# Patient Record
Sex: FEMALE | Race: WHITE | NOT HISPANIC OR LATINO | Employment: STUDENT | URBAN - METROPOLITAN AREA
[De-identification: names, ages, dates, MRNs, and addresses within clinical notes are randomized per-mention and may not be internally consistent; named-entity substitution may affect disease eponyms.]

---
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
## 2021-02-16 DIAGNOSIS — Z30.09 GENERAL COUNSELING FOR PRESCRIPTION OF ORAL CONTRACEPTIVES: ICD-10-CM

## 2021-07-26 DIAGNOSIS — Z30.09 GENERAL COUNSELING FOR PRESCRIPTION OF ORAL CONTRACEPTIVES: ICD-10-CM

## 2021-08-02 ENCOUNTER — E-ADVICE (OUTPATIENT)
Dept: FAMILY MEDICINE | Age: 21
End: 2021-08-02

## 2021-08-20 ENCOUNTER — OFFICE VISIT (OUTPATIENT)
Dept: FAMILY MEDICINE | Age: 21
End: 2021-08-20

## 2021-08-20 VITALS
WEIGHT: 139 LBS | DIASTOLIC BLOOD PRESSURE: 78 MMHG | SYSTOLIC BLOOD PRESSURE: 114 MMHG | TEMPERATURE: 98.7 F | HEART RATE: 68 BPM | HEIGHT: 67 IN | RESPIRATION RATE: 14 BRPM | BODY MASS INDEX: 21.82 KG/M2

## 2021-08-20 DIAGNOSIS — N94.6 DYSMENORRHEA: Primary | ICD-10-CM

## 2021-08-20 PROCEDURE — 99213 OFFICE O/P EST LOW 20 MIN: CPT | Performed by: FAMILY MEDICINE

## 2021-08-20 ASSESSMENT — PATIENT HEALTH QUESTIONNAIRE - PHQ9
CLINICAL INTERPRETATION OF PHQ9 SCORE: NO FURTHER SCREENING NEEDED
CLINICAL INTERPRETATION OF PHQ2 SCORE: NO FURTHER SCREENING NEEDED
SUM OF ALL RESPONSES TO PHQ9 QUESTIONS 1 AND 2: 0
2. FEELING DOWN, DEPRESSED OR HOPELESS: NOT AT ALL
SUM OF ALL RESPONSES TO PHQ9 QUESTIONS 1 AND 2: 0
1. LITTLE INTEREST OR PLEASURE IN DOING THINGS: NOT AT ALL

## 2021-08-22 DIAGNOSIS — Z30.09 GENERAL COUNSELING FOR PRESCRIPTION OF ORAL CONTRACEPTIVES: ICD-10-CM

## 2021-08-23 RX ORDER — NORGESTREL-ETHINYL ESTRADIOL 0.3-0.03MG
TABLET ORAL
Qty: 28 TABLET | Refills: 3 | Status: SHIPPED | OUTPATIENT
Start: 2021-08-23 | End: 2021-08-27 | Stop reason: ALTCHOICE

## 2021-08-27 ENCOUNTER — TELEPHONE (OUTPATIENT)
Dept: FAMILY MEDICINE | Age: 21
End: 2021-08-27

## 2021-08-27 ENCOUNTER — OFFICE VISIT (OUTPATIENT)
Dept: FAMILY MEDICINE | Age: 21
End: 2021-08-27

## 2021-08-27 VITALS
HEIGHT: 67 IN | TEMPERATURE: 98 F | HEART RATE: 78 BPM | DIASTOLIC BLOOD PRESSURE: 76 MMHG | SYSTOLIC BLOOD PRESSURE: 128 MMHG | RESPIRATION RATE: 15 BRPM | BODY MASS INDEX: 21.79 KG/M2 | WEIGHT: 138.8 LBS

## 2021-08-27 DIAGNOSIS — Z30.430 ENCOUNTER FOR IUD INSERTION: Primary | ICD-10-CM

## 2021-08-27 LAB
B-HCG UR QL: NEGATIVE
INTERNAL PROCEDURAL CONTROLS ACCEPTABLE: YES

## 2021-08-27 PROCEDURE — 87591 N.GONORRHOEAE DNA AMP PROB: CPT | Performed by: INTERNAL MEDICINE

## 2021-08-27 PROCEDURE — 81025 URINE PREGNANCY TEST: CPT | Performed by: FAMILY MEDICINE

## 2021-08-27 PROCEDURE — 58300 INSERT INTRAUTERINE DEVICE: CPT | Performed by: FAMILY MEDICINE

## 2021-08-27 PROCEDURE — 87491 CHLMYD TRACH DNA AMP PROBE: CPT | Performed by: INTERNAL MEDICINE

## 2021-08-30 LAB
C TRACH RRNA CVX QL NAA+PROBE: NEGATIVE
Lab: NORMAL
N GONORRHOEA RRNA CVX QL NAA+PROBE: NEGATIVE

## 2022-07-07 ENCOUNTER — IMAGING SERVICES (OUTPATIENT)
Dept: ULTRASOUND IMAGING | Age: 22
End: 2022-07-07
Attending: NURSE PRACTITIONER

## 2022-07-07 ENCOUNTER — OFFICE VISIT (OUTPATIENT)
Dept: OBGYN | Age: 22
End: 2022-07-07

## 2022-07-07 ENCOUNTER — TELEPHONE (OUTPATIENT)
Dept: OBGYN | Age: 22
End: 2022-07-07

## 2022-07-07 VITALS
WEIGHT: 137.79 LBS | HEIGHT: 67 IN | SYSTOLIC BLOOD PRESSURE: 128 MMHG | DIASTOLIC BLOOD PRESSURE: 82 MMHG | BODY MASS INDEX: 21.63 KG/M2

## 2022-07-07 DIAGNOSIS — N92.6 IRREGULAR BLEEDING: ICD-10-CM

## 2022-07-07 DIAGNOSIS — Z30.431 IUD CHECK UP: ICD-10-CM

## 2022-07-07 DIAGNOSIS — Z11.3 ROUTINE SCREENING FOR STI (SEXUALLY TRANSMITTED INFECTION): ICD-10-CM

## 2022-07-07 DIAGNOSIS — Z97.5 IUD (INTRAUTERINE DEVICE) IN PLACE: ICD-10-CM

## 2022-07-07 DIAGNOSIS — Z97.5 IUD (INTRAUTERINE DEVICE) IN PLACE: Primary | ICD-10-CM

## 2022-07-07 PROCEDURE — 87661 TRICHOMONAS VAGINALIS AMPLIF: CPT | Performed by: INTERNAL MEDICINE

## 2022-07-07 PROCEDURE — 81513 NFCT DS BV RNA VAG FLU ALG: CPT | Performed by: INTERNAL MEDICINE

## 2022-07-07 PROCEDURE — 87591 N.GONORRHOEAE DNA AMP PROB: CPT | Performed by: INTERNAL MEDICINE

## 2022-07-07 PROCEDURE — 76830 TRANSVAGINAL US NON-OB: CPT | Performed by: RADIOLOGY

## 2022-07-07 PROCEDURE — 87491 CHLMYD TRACH DNA AMP PROBE: CPT | Performed by: INTERNAL MEDICINE

## 2022-07-07 PROCEDURE — 87481 CANDIDA DNA AMP PROBE: CPT | Performed by: INTERNAL MEDICINE

## 2022-07-07 PROCEDURE — 99213 OFFICE O/P EST LOW 20 MIN: CPT | Performed by: NURSE PRACTITIONER

## 2022-07-07 PROCEDURE — 76856 US EXAM PELVIC COMPLETE: CPT | Performed by: RADIOLOGY

## 2022-07-07 PROCEDURE — 87563 M. GENITALIUM AMP PROBE: CPT | Performed by: INTERNAL MEDICINE

## 2022-07-07 RX ORDER — NORGESTIMATE AND ETHINYL ESTRADIOL 0.25-0.035
1 KIT ORAL DAILY
Qty: 84 TABLET | Refills: 0 | Status: SHIPPED | OUTPATIENT
Start: 2022-07-07 | End: 2022-10-24 | Stop reason: SDUPTHER

## 2022-07-07 RX ORDER — DOXYCYCLINE 100 MG/1
100 CAPSULE ORAL 2 TIMES DAILY
Qty: 20 CAPSULE | Refills: 0 | Status: SHIPPED | OUTPATIENT
Start: 2022-07-07 | End: 2022-07-17

## 2022-07-08 LAB
BACTERIAL VAGINOSIS VAG-IMP: NOT DETECTED
C ALBICANS DNA VAG QL NAA+PROBE: NOT DETECTED
C GLABRATA DNA VAG QL NAA+PROBE: NOT DETECTED
C TRACH RRNA CVX QL NAA+PROBE: NEGATIVE
Lab: NORMAL
M GENITALIUM DNA SPEC QL NAA+PROBE: NOT DETECTED
N GONORRHOEA RRNA CVX QL NAA+PROBE: NEGATIVE
SERVICE CMNT-IMP: NORMAL
T VAGINALIS DNA VAG QL NAA+PROBE: NOT DETECTED
T VAGINALIS RRNA CVX QL NAA+PROBE: NEGATIVE

## 2022-09-27 RX ORDER — NORGESTIMATE AND ETHINYL ESTRADIOL 0.25-0.035
KIT ORAL
Qty: 84 TABLET | Refills: 0 | OUTPATIENT
Start: 2022-09-27

## 2022-10-17 ENCOUNTER — TELEPHONE (OUTPATIENT)
Dept: FAMILY MEDICINE | Age: 22
End: 2022-10-17

## 2022-10-24 ENCOUNTER — OFFICE VISIT (OUTPATIENT)
Dept: FAMILY MEDICINE | Age: 22
End: 2022-10-24

## 2022-10-24 VITALS
HEART RATE: 70 BPM | WEIGHT: 137 LBS | SYSTOLIC BLOOD PRESSURE: 130 MMHG | DIASTOLIC BLOOD PRESSURE: 80 MMHG | TEMPERATURE: 98.3 F | HEIGHT: 67 IN | BODY MASS INDEX: 21.5 KG/M2 | RESPIRATION RATE: 14 BRPM

## 2022-10-24 DIAGNOSIS — Z12.4 CERVICAL CANCER SCREENING: ICD-10-CM

## 2022-10-24 DIAGNOSIS — Z30.41 SURVEILLANCE OF PREVIOUSLY PRESCRIBED CONTRACEPTIVE PILL: ICD-10-CM

## 2022-10-24 DIAGNOSIS — Z30.432 ENCOUNTER FOR IUD REMOVAL: Primary | ICD-10-CM

## 2022-10-24 DIAGNOSIS — N93.9 VAGINAL SPOTTING: ICD-10-CM

## 2022-10-24 PROCEDURE — 99213 OFFICE O/P EST LOW 20 MIN: CPT | Performed by: FAMILY MEDICINE

## 2022-10-24 PROCEDURE — 58301 REMOVE INTRAUTERINE DEVICE: CPT | Performed by: FAMILY MEDICINE

## 2022-10-24 PROCEDURE — 88175 CYTOPATH C/V AUTO FLUID REDO: CPT | Performed by: PATHOLOGY

## 2022-10-24 RX ORDER — NORGESTIMATE AND ETHINYL ESTRADIOL 0.25-0.035
1 KIT ORAL DAILY
Qty: 84 TABLET | Refills: 1 | Status: SHIPPED | OUTPATIENT
Start: 2022-10-24 | End: 2023-03-15

## 2022-10-26 LAB — HOLD SPECIMEN: NORMAL

## 2022-10-28 LAB
CASE RPRT: NORMAL
CLINICAL INFO: NORMAL
CYTOLOGY CVX/VAG STUDY: NORMAL
PAP EDUCATIONAL NOTE: NORMAL
SPECIMEN ADEQUACY: NORMAL

## 2023-03-14 DIAGNOSIS — Z30.41 SURVEILLANCE OF PREVIOUSLY PRESCRIBED CONTRACEPTIVE PILL: ICD-10-CM

## 2023-03-15 RX ORDER — NORGESTIMATE AND ETHINYL ESTRADIOL 0.25-0.035
1 KIT ORAL DAILY
Qty: 84 TABLET | Refills: 1 | OUTPATIENT
Start: 2023-03-15

## 2023-03-15 RX ORDER — NORGESTIMATE AND ETHINYL ESTRADIOL 0.25-0.035
KIT ORAL
Qty: 84 TABLET | Refills: 1 | Status: SHIPPED | OUTPATIENT
Start: 2023-03-15 | End: 2023-09-05

## 2023-05-04 ENCOUNTER — APPOINTMENT (OUTPATIENT)
Dept: URBAN - METROPOLITAN AREA CLINIC 239 | Age: 23
Setting detail: DERMATOLOGY
End: 2023-05-06

## 2023-05-04 DIAGNOSIS — L72.8 OTHER FOLLICULAR CYSTS OF THE SKIN AND SUBCUTANEOUS TISSUE: ICD-10-CM

## 2023-05-04 PROCEDURE — 11900 INJECT SKIN LESIONS </W 7: CPT

## 2023-05-04 PROCEDURE — OTHER PRESCRIPTION MEDICATION MANAGEMENT: OTHER

## 2023-05-04 PROCEDURE — OTHER INTRALESIONAL KENALOG: OTHER

## 2023-05-04 PROCEDURE — OTHER PRESCRIPTION: OTHER

## 2023-05-04 RX ORDER — HALOBETASOL PROPIONATE 0.5 MG/G
CREAM TOPICAL
Qty: 15 | Refills: 0 | Status: ERX | COMMUNITY
Start: 2023-05-04

## 2023-05-04 ASSESSMENT — LOCATION SIMPLE DESCRIPTION DERM: LOCATION SIMPLE: NOSE

## 2023-05-04 ASSESSMENT — LOCATION ZONE DERM: LOCATION ZONE: NOSE

## 2023-05-04 ASSESSMENT — LOCATION DETAILED DESCRIPTION DERM: LOCATION DETAILED: NASAL ROOT

## 2023-05-04 NOTE — PROCEDURE: PRESCRIPTION MEDICATION MANAGEMENT
Initiate Treatment: Discussed that pt can use halobetasol bid to tid for 2 to 3 days (maximum) on a new acne cyst.  She gets 2 to 3 or these per month
Render In Strict Bullet Format?: No
Detail Level: Zone

## 2023-09-04 DIAGNOSIS — Z30.41 SURVEILLANCE OF PREVIOUSLY PRESCRIBED CONTRACEPTIVE PILL: ICD-10-CM

## 2023-09-05 RX ORDER — NORGESTIMATE AND ETHINYL ESTRADIOL 0.25-0.035
KIT ORAL
Qty: 84 TABLET | Refills: 1 | Status: SHIPPED | OUTPATIENT
Start: 2023-09-05

## 2024-02-06 DIAGNOSIS — Z30.41 SURVEILLANCE OF PREVIOUSLY PRESCRIBED CONTRACEPTIVE PILL: ICD-10-CM

## 2024-02-07 RX ORDER — NORGESTIMATE AND ETHINYL ESTRADIOL 0.25-0.035
1 KIT ORAL DAILY
Qty: 84 TABLET | Refills: 1 | OUTPATIENT
Start: 2024-02-07

## 2024-02-11 DIAGNOSIS — Z30.41 SURVEILLANCE OF PREVIOUSLY PRESCRIBED CONTRACEPTIVE PILL: ICD-10-CM

## 2024-02-12 RX ORDER — NORGESTIMATE AND ETHINYL ESTRADIOL 0.25-0.035
1 KIT ORAL DAILY
Qty: 84 TABLET | Refills: 1 | OUTPATIENT
Start: 2024-02-12

## 2024-03-02 DIAGNOSIS — Z30.41 SURVEILLANCE OF PREVIOUSLY PRESCRIBED CONTRACEPTIVE PILL: ICD-10-CM

## 2024-03-04 RX ORDER — NORGESTIMATE AND ETHINYL ESTRADIOL 0.25-0.035
KIT ORAL
Qty: 84 TABLET | Refills: 1 | OUTPATIENT
Start: 2024-03-04

## 2024-03-07 RX ORDER — NORGESTIMATE AND ETHINYL ESTRADIOL 0.25-0.035
1 KIT ORAL DAILY
Qty: 84 TABLET | Refills: 0 | Status: SHIPPED | OUTPATIENT
Start: 2024-03-07

## 2024-04-12 ENCOUNTER — APPOINTMENT (OUTPATIENT)
Dept: FAMILY MEDICINE | Age: 24
End: 2024-04-12

## 2024-05-14 ENCOUNTER — APPOINTMENT (OUTPATIENT)
Dept: FAMILY MEDICINE | Age: 24
End: 2024-05-14

## 2024-05-14 VITALS
RESPIRATION RATE: 15 BRPM | SYSTOLIC BLOOD PRESSURE: 122 MMHG | WEIGHT: 151 LBS | DIASTOLIC BLOOD PRESSURE: 84 MMHG | BODY MASS INDEX: 23.65 KG/M2 | TEMPERATURE: 97.9 F | HEART RATE: 74 BPM

## 2024-05-14 DIAGNOSIS — Z00.01 ENCOUNTER FOR ROUTINE ADULT HEALTH EXAMINATION WITH ABNORMAL FINDINGS: Primary | ICD-10-CM

## 2024-05-14 DIAGNOSIS — Z30.41 SURVEILLANCE OF PREVIOUSLY PRESCRIBED CONTRACEPTIVE PILL: ICD-10-CM

## 2024-05-14 DIAGNOSIS — J30.2 SEASONAL ALLERGIES: ICD-10-CM

## 2024-05-14 PROCEDURE — 99395 PREV VISIT EST AGE 18-39: CPT | Performed by: FAMILY MEDICINE

## 2024-05-14 RX ORDER — NORGESTIMATE AND ETHINYL ESTRADIOL 0.25-0.035
1 KIT ORAL DAILY
Qty: 84 TABLET | Refills: 3 | Status: SHIPPED
Start: 2024-05-14

## 2024-05-14 ASSESSMENT — PATIENT HEALTH QUESTIONNAIRE - PHQ9
CLINICAL INTERPRETATION OF PHQ2 SCORE: NO FURTHER SCREENING NEEDED
SUM OF ALL RESPONSES TO PHQ9 QUESTIONS 1 AND 2: 0
1. LITTLE INTEREST OR PLEASURE IN DOING THINGS: NOT AT ALL
2. FEELING DOWN, DEPRESSED OR HOPELESS: NOT AT ALL
SUM OF ALL RESPONSES TO PHQ9 QUESTIONS 1 AND 2: 0

## 2024-06-09 DIAGNOSIS — Z30.41 SURVEILLANCE OF PREVIOUSLY PRESCRIBED CONTRACEPTIVE PILL: ICD-10-CM

## 2024-06-10 RX ORDER — NORGESTIMATE AND ETHINYL ESTRADIOL 0.25-0.035
1 KIT ORAL DAILY
Qty: 84 TABLET | Refills: 3 | Status: SHIPPED | OUTPATIENT
Start: 2024-06-10

## 2024-07-22 ENCOUNTER — E-ADVICE (OUTPATIENT)
Dept: FAMILY MEDICINE | Age: 24
End: 2024-07-22

## 2024-07-22 DIAGNOSIS — Z30.41 SURVEILLANCE OF PREVIOUSLY PRESCRIBED CONTRACEPTIVE PILL: ICD-10-CM

## 2024-07-22 DIAGNOSIS — F41.1 GENERALIZED ANXIETY DISORDER: Primary | ICD-10-CM

## 2024-07-23 RX ORDER — BUSPIRONE HYDROCHLORIDE 7.5 MG/1
7.5 TABLET ORAL 2 TIMES DAILY
Qty: 60 TABLET | Refills: 1 | Status: SHIPPED | OUTPATIENT
Start: 2024-07-23

## 2024-09-24 DIAGNOSIS — F41.1 GENERALIZED ANXIETY DISORDER: ICD-10-CM

## 2024-09-24 RX ORDER — BUSPIRONE HYDROCHLORIDE 7.5 MG/1
7.5 TABLET ORAL 2 TIMES DAILY
Qty: 60 TABLET | Refills: 0 | Status: SHIPPED | OUTPATIENT
Start: 2024-09-24 | End: 2024-09-26 | Stop reason: SDUPTHER

## 2024-09-26 DIAGNOSIS — F41.1 GENERALIZED ANXIETY DISORDER: ICD-10-CM

## 2024-09-27 RX ORDER — BUSPIRONE HYDROCHLORIDE 7.5 MG/1
7.5 TABLET ORAL 2 TIMES DAILY
Qty: 60 TABLET | Refills: 0 | Status: SHIPPED | OUTPATIENT
Start: 2024-09-27

## 2024-11-24 DIAGNOSIS — Z30.41 SURVEILLANCE OF PREVIOUSLY PRESCRIBED CONTRACEPTIVE PILL: ICD-10-CM

## 2024-11-25 RX ORDER — NORGESTIMATE AND ETHINYL ESTRADIOL 0.25-0.035
1 KIT ORAL DAILY
Qty: 84 TABLET | Refills: 3 | Status: SHIPPED | OUTPATIENT
Start: 2024-11-25

## 2024-12-08 DIAGNOSIS — F41.1 GENERALIZED ANXIETY DISORDER: ICD-10-CM

## 2024-12-09 DIAGNOSIS — F41.1 GENERALIZED ANXIETY DISORDER: ICD-10-CM

## 2024-12-09 RX ORDER — BUSPIRONE HYDROCHLORIDE 7.5 MG/1
7.5 TABLET ORAL 2 TIMES DAILY
Qty: 180 TABLET | Refills: 0 | Status: SHIPPED | OUTPATIENT
Start: 2024-12-09

## 2024-12-09 RX ORDER — BUSPIRONE HYDROCHLORIDE 7.5 MG/1
7.5 TABLET ORAL 2 TIMES DAILY
Qty: 180 TABLET | Refills: 0 | Status: CANCELLED | OUTPATIENT
Start: 2024-12-09

## 2025-03-01 DIAGNOSIS — Z30.41 SURVEILLANCE OF PREVIOUSLY PRESCRIBED CONTRACEPTIVE PILL: ICD-10-CM

## 2025-03-03 RX ORDER — NORGESTIMATE AND ETHINYL ESTRADIOL 0.25-0.035
1 KIT ORAL DAILY
Qty: 84 TABLET | Refills: 3 | Status: SHIPPED | OUTPATIENT
Start: 2025-03-03

## 2025-03-22 DIAGNOSIS — F41.1 GENERALIZED ANXIETY DISORDER: ICD-10-CM

## 2025-03-24 RX ORDER — BUSPIRONE HYDROCHLORIDE 7.5 MG/1
7.5 TABLET ORAL 2 TIMES DAILY
Qty: 180 TABLET | Refills: 0 | Status: SHIPPED | OUTPATIENT
Start: 2025-03-24

## 2025-08-03 DIAGNOSIS — F41.1 GENERALIZED ANXIETY DISORDER: ICD-10-CM

## 2025-08-05 DIAGNOSIS — F41.1 GENERALIZED ANXIETY DISORDER: ICD-10-CM

## 2025-08-05 RX ORDER — BUSPIRONE HYDROCHLORIDE 7.5 MG/1
7.5 TABLET ORAL 2 TIMES DAILY
Qty: 180 TABLET | Refills: 1 | Status: SHIPPED | OUTPATIENT
Start: 2025-08-05

## 2025-08-05 RX ORDER — BUSPIRONE HYDROCHLORIDE 7.5 MG/1
7.5 TABLET ORAL 2 TIMES DAILY
Qty: 180 TABLET | Refills: 1 | OUTPATIENT
Start: 2025-08-05

## 2025-08-15 SDOH — ECONOMIC STABILITY: FOOD INSECURITY: WITHIN THE PAST 12 MONTHS, THE FOOD YOU BOUGHT JUST DIDN'T LAST AND YOU DIDN'T HAVE MONEY TO GET MORE.: NEVER TRUE

## 2025-08-15 SDOH — ECONOMIC STABILITY: HOUSING INSECURITY: DO YOU HAVE PROBLEMS WITH ANY OF THE FOLLOWING?: NONE OF THE ABOVE

## 2025-08-15 SDOH — ECONOMIC STABILITY: TRANSPORTATION INSECURITY
IN THE PAST 12 MONTHS, HAS LACK OF RELIABLE TRANSPORTATION KEPT YOU FROM MEDICAL APPOINTMENTS, MEETINGS, WORK OR FROM GETTING THINGS NEEDED FOR DAILY LIVING?: NO

## 2025-08-15 SDOH — ECONOMIC STABILITY: HOUSING INSECURITY: WHAT IS YOUR LIVING SITUATION TODAY?: I HAVE A STEADY PLACE TO LIVE

## 2025-08-15 ASSESSMENT — SOCIAL DETERMINANTS OF HEALTH (SDOH): IN THE PAST 12 MONTHS, HAS THE ELECTRIC, GAS, OIL, OR WATER COMPANY THREATENED TO SHUT OFF SERVICE IN YOUR HOME?: NO

## 2025-08-22 ENCOUNTER — APPOINTMENT (OUTPATIENT)
Dept: FAMILY MEDICINE | Age: 25
End: 2025-08-22

## 2025-08-22 VITALS
TEMPERATURE: 97.8 F | DIASTOLIC BLOOD PRESSURE: 70 MMHG | BODY MASS INDEX: 22.44 KG/M2 | RESPIRATION RATE: 18 BRPM | HEIGHT: 67 IN | WEIGHT: 143 LBS | HEART RATE: 95 BPM | OXYGEN SATURATION: 100 % | SYSTOLIC BLOOD PRESSURE: 126 MMHG

## 2025-08-22 DIAGNOSIS — Z00.01 ENCOUNTER FOR ROUTINE ADULT HEALTH EXAMINATION WITH ABNORMAL FINDINGS: Primary | ICD-10-CM

## 2025-08-22 DIAGNOSIS — Z30.41 SURVEILLANCE OF PREVIOUSLY PRESCRIBED CONTRACEPTIVE PILL: ICD-10-CM

## 2025-08-22 DIAGNOSIS — J30.2 SEASONAL ALLERGIES: ICD-10-CM

## 2025-08-22 DIAGNOSIS — F41.1 GENERALIZED ANXIETY DISORDER: ICD-10-CM

## 2025-08-22 ASSESSMENT — PATIENT HEALTH QUESTIONNAIRE - PHQ9
CLINICAL INTERPRETATION OF PHQ2 SCORE: NO FURTHER SCREENING NEEDED
SUM OF ALL RESPONSES TO PHQ9 QUESTIONS 1 AND 2: 0
1. LITTLE INTEREST OR PLEASURE IN DOING THINGS: NOT AT ALL
SUM OF ALL RESPONSES TO PHQ9 QUESTIONS 1 AND 2: 0
2. FEELING DOWN, DEPRESSED OR HOPELESS: NOT AT ALL